# Patient Record
Sex: FEMALE | Race: WHITE | NOT HISPANIC OR LATINO | URBAN - METROPOLITAN AREA
[De-identification: names, ages, dates, MRNs, and addresses within clinical notes are randomized per-mention and may not be internally consistent; named-entity substitution may affect disease eponyms.]

---

## 2017-07-04 ENCOUNTER — NURSE TRIAGE (OUTPATIENT)
Dept: ADMINISTRATIVE | Facility: CLINIC | Age: 72
End: 2017-07-04

## 2017-07-04 NOTE — TELEPHONE ENCOUNTER
"Pt called re Ucx. Wanting to know about results. Pt not really feeling better. Having urgency again.     Reason for Disposition   [1] Follow-up call to recent contact AND [2] information only call, no triage required    Answer Assessment - Initial Assessment Questions  1. SYMPTOM: "What's the main symptom you're concerned about?" (e.g., frequency, incontinence)    Still having urgency this am. Pt would like to get more pyridium. MD in [place]. No MD on file except ED visit as pt on vacation.   2. ONSET: "When did the  ________  start?"     7/2  3. PAIN: "Is there any pain?" If so, ask: "How bad is it?" (Scale: 1-10; mild, moderate, severe)     urgency  4. CAUSE: "What do you think is causing the symptoms?"     UTI   5. OTHER SYMPTOMS: "Do you have any other symptoms?" (e.g., fever, flank pain, blood in urine, pain with urination)     No    Protocols used: ST INFORMATION ONLY CALL-A-VESNA ST URINARY SYMPTOMS-A-    rec azo OTC. Call ER or PCP for add'l rx. Awaiting full ucx results- likely mingo. Call back with questions.   "